# Patient Record
Sex: FEMALE | Race: WHITE | Employment: OTHER | URBAN - METROPOLITAN AREA
[De-identification: names, ages, dates, MRNs, and addresses within clinical notes are randomized per-mention and may not be internally consistent; named-entity substitution may affect disease eponyms.]

---

## 2017-03-25 PROBLEM — J10.1 INFLUENZA A: Status: ACTIVE | Noted: 2017-03-25

## 2017-03-25 PROBLEM — J18.9 CAP (COMMUNITY ACQUIRED PNEUMONIA): Status: ACTIVE | Noted: 2017-03-25

## 2017-03-25 PROBLEM — F41.9 ANXIETY AND DEPRESSION: Status: ACTIVE | Noted: 2017-03-25

## 2017-03-25 PROBLEM — F32.A ANXIETY AND DEPRESSION: Status: ACTIVE | Noted: 2017-03-25

## 2017-04-21 ENCOUNTER — HOSPITAL ENCOUNTER (OUTPATIENT)
Dept: MAMMOGRAPHY | Age: 82
Discharge: HOME | End: 2017-04-21
Attending: RADIOLOGY | Admitting: RADIOLOGY

## 2017-04-21 DIAGNOSIS — C50.411 BREAST CANCER OF UPPER-OUTER QUADRANT OF RIGHT FEMALE BREAST (HCC): ICD-10-CM

## 2017-10-27 ENCOUNTER — HOSPITAL ENCOUNTER (OUTPATIENT)
Dept: MAMMOGRAPHY | Age: 82
Discharge: HOME | End: 2017-10-27
Attending: RADIOLOGY | Admitting: RADIOLOGY

## 2017-10-27 DIAGNOSIS — C50.411 MALIGNANT NEOPLASM OF UPPER-OUTER QUADRANT OF RIGHT FEMALE BREAST, UNSPECIFIED ESTROGEN RECEPTOR STATUS (HCC): ICD-10-CM

## 2017-11-20 ENCOUNTER — OFFICE VISIT (OUTPATIENT)
Dept: DERMATOLOGY | Age: 82
End: 2017-11-20

## 2017-11-20 DIAGNOSIS — Z85.828 HISTORY OF NONMELANOMA SKIN CANCER: ICD-10-CM

## 2017-11-20 DIAGNOSIS — Z12.83 SCREENING EXAM FOR SKIN CANCER: ICD-10-CM

## 2017-11-20 DIAGNOSIS — D48.5 NEOPLASM OF UNCERTAIN BEHAVIOR OF SKIN: ICD-10-CM

## 2017-11-20 DIAGNOSIS — L57.0 ACTINIC KERATOSES: Primary | ICD-10-CM

## 2017-11-20 PROCEDURE — 1036F TOBACCO NON-USER: CPT | Performed by: DERMATOLOGY

## 2017-11-20 PROCEDURE — 17003 DESTRUCT PREMALG LES 2-14: CPT | Performed by: DERMATOLOGY

## 2017-11-20 PROCEDURE — G8400 PT W/DXA NO RESULTS DOC: HCPCS | Performed by: DERMATOLOGY

## 2017-11-20 PROCEDURE — 99213 OFFICE O/P EST LOW 20 MIN: CPT | Performed by: DERMATOLOGY

## 2017-11-20 PROCEDURE — G8484 FLU IMMUNIZE NO ADMIN: HCPCS | Performed by: DERMATOLOGY

## 2017-11-20 PROCEDURE — 17000 DESTRUCT PREMALG LESION: CPT | Performed by: DERMATOLOGY

## 2017-11-20 PROCEDURE — G8421 BMI NOT CALCULATED: HCPCS | Performed by: DERMATOLOGY

## 2017-11-20 PROCEDURE — G8427 DOCREV CUR MEDS BY ELIG CLIN: HCPCS | Performed by: DERMATOLOGY

## 2017-11-20 PROCEDURE — 11100 PR BIOPSY OF SKIN LESION: CPT | Performed by: DERMATOLOGY

## 2017-11-20 PROCEDURE — 1123F ACP DISCUSS/DSCN MKR DOCD: CPT | Performed by: DERMATOLOGY

## 2017-11-20 PROCEDURE — 4040F PNEUMOC VAC/ADMIN/RCVD: CPT | Performed by: DERMATOLOGY

## 2017-11-20 PROCEDURE — 1090F PRES/ABSN URINE INCON ASSESS: CPT | Performed by: DERMATOLOGY

## 2017-11-20 RX ORDER — L-DESOXYEPHEDRINE 50 MG
INHALER (EA) NASAL
COMMUNITY

## 2017-11-20 NOTE — PROGRESS NOTES
[facility]) Dermatology  Silvia Dixon MD  [phone]      Ozzy Contreras  1934    80 y.o. female     Date of Visit: 11/20/2017    Last Visit: 6mo    Chief Complaint: Skin check    History of Present Illness:  1. Here for skin check. Hx NMSC - nBCC R posterior neck s/p Mohs 10/2015, SCCIS L upper lip s/p ED&C 9/2015, superficial SCC R shin s/p Mohs 10/2015.   -No new lesions of concern   -Avoids sun as much as possible   2. History of actinic keratoses s/p cryotherapy. Reports rough lesions on hands. Review of Systems:  Constitutional: Reports general sense of well-being. Skin: No interval severe sunburns. Allergies: Reviewed and updated. Past Medical History, Surgical History, Medications and Allergies reviewed. Past Medical History:   Diagnosis Date    Alzheimer disease     Anxiety     Cancer ([facility] [address]     skin-various basal and squamous cell.  Dr. Aaron Ordoñez, Dr. 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    Influenza A 03/24/2017    rapid nasal     Past Surgical History:   Procedure Laterality Date    BREAST LUMPECTOMY  2010    COLONOSCOPY  17 Aug 2016    Diverticulosis    OTHER SURGICAL HISTORY Right 10/31/14    incision and drainage of right breast abcess    OTHER SURGICAL HISTORY      excision mass right lower leg    TONSILLECTOMY      UPPER GASTROINTESTINAL ENDOSCOPY         Allergies   Allergen Reactions    Polysporin [Bacitracin-Polymyxin B]      Contact dermititis/\"all sporins\"     Outpatient Prescriptions Marked as Taking for the 11/20/17 encounter (Office Visit) with Silvia Dixon MD   Medication Sig Dispense Refill    Camphor-Eucalyptus-Menthol (VICKS VAPORUB) 4.7-1.2-2.6 % OINT Apply topically      sertraline (ZOLOFT) 25 MG tablet Take 1 tablet by mouth daily 30 tablet 3    LORazepam (ATIVAN) 0.5 MG tablet Take 0.5 mg by mouth 2 times daily as needed      traZODone (DESYREL) 50 MG tablet Take 50 mg by mouth nightly       aspirin 81 MG chewable risk of blister formation, discomfort, scar, hypopigmentation. Discussed wound care.

## 2017-11-29 ENCOUNTER — TELEPHONE (OUTPATIENT)
Dept: DERMATOLOGY | Age: 82
End: 2017-11-29

## 2017-11-29 NOTE — TELEPHONE ENCOUNTER
Called & spoke to PT to review results of the biopsy. Date of biopsy: 11/20/2017    Site of biopsy: LT upper back    Result: BCC, focally infiltrating    Plan: Schedule for excision - 1/11/18 @ 11:30a - arrive 11:00a  @ [facility]. (Pre op letter mailed) [facility] 11/29/2017    The patient expressed understanding of the plan. I advised to call w/any questions/concerns.

## 2018-01-11 ENCOUNTER — PROCEDURE VISIT (OUTPATIENT)
Dept: DERMATOLOGY | Age: 83
End: 2018-01-11

## 2018-01-11 VITALS
HEART RATE: 88 BPM | DIASTOLIC BLOOD PRESSURE: 72 MMHG | BODY MASS INDEX: 30.29 KG/M2 | WEIGHT: 182 LBS | SYSTOLIC BLOOD PRESSURE: 111 MMHG

## 2018-01-11 DIAGNOSIS — C44.519 BASAL CELL CARCINOMA OF UPPER BACK: Primary | ICD-10-CM

## 2018-01-11 PROCEDURE — 11602 EXC TR-EXT MAL+MARG 1.1-2 CM: CPT | Performed by: DERMATOLOGY

## 2018-01-11 PROCEDURE — 12032 INTMD RPR S/A/T/EXT 2.6-7.5: CPT | Performed by: DERMATOLOGY

## 2018-01-11 NOTE — PROGRESS NOTES
[facility]) Dermatology  Sukhi Pedro MD  [phone]      Shyanne Contreras  1934    80 y.o. female     Date of Visit: 1/11/2018    Chief Complaint: BCC    History of Present Illness:  1. Pt is here for treatment of focally infiltrating BCC on L upper back. No concerns since biopsy.    -Pacemaker/ICD: No  -Joint prosthesis: No  -Difficulty with numbing in the past: No  -Local Anesthesia Reaction: No  -Artificial Heart Valve: No  -Organ Transplant: No  -Immunosuppression: No  -Bleeding/Clotting Disorders: No  -Anticoagulant Therapy: No  -Prior problems with wound healing: No    Review of Systems:  Constitutional: Reports general sense of well-being. Past Medical History, Medications and Allergies reviewed. Past Medical History:   Diagnosis Date    Alzheimer disease     Anxiety     Cancer ([facility] [address]     skin-various basal and squamous cell.  Dr. Mahendra Ochoa, Dr. 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    Influenza A 03/24/2017    rapid nasal     Past Surgical History:   Procedure Laterality Date    BREAST LUMPECTOMY  2010    COLONOSCOPY  17 Aug 2016    Diverticulosis    OTHER SURGICAL HISTORY Right 10/31/14    incision and drainage of right breast abcess    OTHER SURGICAL HISTORY      excision mass right lower leg    TONSILLECTOMY      UPPER GASTROINTESTINAL ENDOSCOPY         Allergies   Allergen Reactions    Polysporin [Bacitracin-Polymyxin B]      Contact dermititis/\"all sporins\"     Outpatient Prescriptions Marked as Taking for the 1/11/18 encounter (Procedure visit) with Sukhi Pedro MD   Medication Sig Dispense Refill    Camphor-Eucalyptus-Menthol (VICKS VAPORUB) 4.7-1.2-2.6 % OINT Apply topically      sertraline (ZOLOFT) 25 MG tablet Take 1 tablet by mouth daily 30 tablet 3    LORazepam (ATIVAN) 0.5 MG tablet Take 0.5 mg by mouth 2 times daily as needed      aspirin 81 MG chewable tablet Take 1 tablet by mouth daily 30 tablet 0       Physical Examination

## 2018-01-15 ENCOUNTER — TELEPHONE (OUTPATIENT)
Dept: DERMATOLOGY | Age: 83
End: 2018-01-15

## 2018-01-15 NOTE — TELEPHONE ENCOUNTER
Spoke with patient's spouse Gwen Contreras (ok per [employer]) and informed him of biopsy result from 1-11-18. 1.Location: Left upper back     Result: No residual basal cell carcinoma within the re-excised tissue.      Plan: No further treatment needed

## 2018-01-25 ENCOUNTER — NURSE ONLY (OUTPATIENT)
Dept: DERMATOLOGY | Age: 83
End: 2018-01-25

## 2018-01-25 DIAGNOSIS — C44.519 BASAL CELL CARCINOMA OF SKIN OF OTHER PART OF TRUNK: Primary | ICD-10-CM

## 2018-01-25 PROCEDURE — 99024 POSTOP FOLLOW-UP VISIT: CPT | Performed by: DERMATOLOGY

## 2018-01-25 NOTE — PROGRESS NOTES
Patient presents for suture removal. The wound is well healed without signs of infection. The sutures are removed. Wound care and activity instructions given. Return prn.

## 2018-05-17 ENCOUNTER — OFFICE VISIT (OUTPATIENT)
Dept: DERMATOLOGY | Age: 83
End: 2018-05-17

## 2018-05-17 DIAGNOSIS — Z12.83 SCREENING EXAM FOR SKIN CANCER: ICD-10-CM

## 2018-05-17 DIAGNOSIS — L57.0 ACTINIC KERATOSES: Primary | ICD-10-CM

## 2018-05-17 DIAGNOSIS — Z85.828 HISTORY OF NONMELANOMA SKIN CANCER: ICD-10-CM

## 2018-05-17 PROCEDURE — G8400 PT W/DXA NO RESULTS DOC: HCPCS | Performed by: DERMATOLOGY

## 2018-05-17 PROCEDURE — 99213 OFFICE O/P EST LOW 20 MIN: CPT | Performed by: DERMATOLOGY

## 2018-05-17 PROCEDURE — 1090F PRES/ABSN URINE INCON ASSESS: CPT | Performed by: DERMATOLOGY

## 2018-05-17 PROCEDURE — G8427 DOCREV CUR MEDS BY ELIG CLIN: HCPCS | Performed by: DERMATOLOGY

## 2018-05-17 PROCEDURE — 17003 DESTRUCT PREMALG LES 2-14: CPT | Performed by: DERMATOLOGY

## 2018-05-17 PROCEDURE — 4040F PNEUMOC VAC/ADMIN/RCVD: CPT | Performed by: DERMATOLOGY

## 2018-05-17 PROCEDURE — G8417 CALC BMI ABV UP PARAM F/U: HCPCS | Performed by: DERMATOLOGY

## 2018-05-17 PROCEDURE — 1036F TOBACCO NON-USER: CPT | Performed by: DERMATOLOGY

## 2018-05-17 PROCEDURE — 1123F ACP DISCUSS/DSCN MKR DOCD: CPT | Performed by: DERMATOLOGY

## 2018-05-17 PROCEDURE — 17000 DESTRUCT PREMALG LESION: CPT | Performed by: DERMATOLOGY

## 2018-05-17 RX ORDER — DIVALPROEX SODIUM 125 MG/1
125 CAPSULE, COATED PELLETS ORAL 2 TIMES DAILY
COMMUNITY

## 2018-07-18 ENCOUNTER — HOSPITAL ENCOUNTER (OUTPATIENT)
Dept: WOMENS IMAGING | Age: 83
Discharge: HOME | End: 2018-07-18
Payer: MEDICARE

## 2018-07-18 DIAGNOSIS — Z78.0 POST-MENOPAUSAL: ICD-10-CM

## 2018-07-18 PROCEDURE — 77080 DXA BONE DENSITY AXIAL: CPT

## 2018-09-26 PROBLEM — J10.1 INFLUENZA A: Status: RESOLVED | Noted: 2017-03-25 | Resolved: 2018-09-26

## 2018-11-26 ENCOUNTER — OFFICE VISIT (OUTPATIENT)
Dept: DERMATOLOGY | Age: 83
End: 2018-11-26
Payer: MEDICARE

## 2018-11-26 DIAGNOSIS — L57.0 ACTINIC KERATOSES: ICD-10-CM

## 2018-11-26 DIAGNOSIS — Z12.83 SCREENING EXAM FOR SKIN CANCER: ICD-10-CM

## 2018-11-26 DIAGNOSIS — Z85.828 HISTORY OF NONMELANOMA SKIN CANCER: ICD-10-CM

## 2018-11-26 DIAGNOSIS — D48.5 NEOPLASM OF UNCERTAIN BEHAVIOR OF SKIN: Primary | ICD-10-CM

## 2018-11-26 PROCEDURE — 1090F PRES/ABSN URINE INCON ASSESS: CPT | Performed by: DERMATOLOGY

## 2018-11-26 PROCEDURE — 1036F TOBACCO NON-USER: CPT | Performed by: DERMATOLOGY

## 2018-11-26 PROCEDURE — G8417 CALC BMI ABV UP PARAM F/U: HCPCS | Performed by: DERMATOLOGY

## 2018-11-26 PROCEDURE — 99213 OFFICE O/P EST LOW 20 MIN: CPT | Performed by: DERMATOLOGY

## 2018-11-26 PROCEDURE — 4040F PNEUMOC VAC/ADMIN/RCVD: CPT | Performed by: DERMATOLOGY

## 2018-11-26 PROCEDURE — G8427 DOCREV CUR MEDS BY ELIG CLIN: HCPCS | Performed by: DERMATOLOGY

## 2018-11-26 PROCEDURE — 11100 PR BIOPSY OF SKIN LESION: CPT | Performed by: DERMATOLOGY

## 2018-11-26 PROCEDURE — G8484 FLU IMMUNIZE NO ADMIN: HCPCS | Performed by: DERMATOLOGY

## 2018-11-26 PROCEDURE — 1123F ACP DISCUSS/DSCN MKR DOCD: CPT | Performed by: DERMATOLOGY

## 2018-11-26 PROCEDURE — 1101F PT FALLS ASSESS-DOCD LE1/YR: CPT | Performed by: DERMATOLOGY

## 2018-11-26 PROCEDURE — 17003 DESTRUCT PREMALG LES 2-14: CPT | Performed by: DERMATOLOGY

## 2018-11-26 PROCEDURE — G8399 PT W/DXA RESULTS DOCUMENT: HCPCS | Performed by: DERMATOLOGY

## 2018-11-26 PROCEDURE — 17000 DESTRUCT PREMALG LESION: CPT | Performed by: DERMATOLOGY

## 2018-11-26 NOTE — PROGRESS NOTES
[facility]) Dermatology  Hero Cedillo MD  [phone]      Jose E Contreras  1934    80 y.o. female     Date of Visit: 11/26/2018    Last Visit: 6mo    Chief Complaint: Skin check    History of Present Illness:  1. Here for skin check. Hx NMSC - nBCC R posterior neck s/p Mohs 10/2015, SCCIS L upper lip s/p ED&C 9/2015, superficial SCC R shin s/p Mohs 10/2015, focally infiltrating L upper back s/p excision 1/2018.   -No new lesions of concern   -Avoids sun as much as possible   2. History of actinic keratoses s/p cryotherapy. Unsure of new lesions     Review of Systems:  Constitutional: Reports general sense of well-being. Skin: No interval severe sunburns. Allergies: Reviewed and updated. Past Medical History, Surgical History, Medications and Allergies reviewed. Past Medical History:   Diagnosis Date    Alzheimer disease     Anxiety     Cancer ([facility] [address]     skin-various basal and squamous cell.  Dr. Ivet Baumann, Dr. 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    Influenza A 03/24/2017    rapid nasal     Past Surgical History:   Procedure Laterality Date    BREAST LUMPECTOMY  2010    COLONOSCOPY  17 Aug 2016    Diverticulosis    OTHER SURGICAL HISTORY Right 10/31/14    incision and drainage of right breast abcess    OTHER SURGICAL HISTORY      excision mass right lower leg    TONSILLECTOMY      UPPER GASTROINTESTINAL ENDOSCOPY         Allergies   Allergen Reactions    Polysporin [Bacitracin-Polymyxin B]      Contact dermititis/\"all sporins\"     Outpatient Prescriptions Marked as Taking for the 11/26/18 encounter (Office Visit) with Hero Cedillo MD   Medication Sig Dispense Refill    cyanocobalamin 1000 MCG tablet Take 1,000 mcg by mouth      divalproex (DEPAKOTE SPRINKLES) 125 MG capsule Take 125 mg by mouth 2 times daily      Camphor-Eucalyptus-Menthol (VICKS VAPORUB) 4.7-1.2-2.6 % OINT Apply topically      sertraline (ZOLOFT) 25 MG tablet Take 1 tablet by mouth

## 2018-11-29 LAB — DERMATOLOGY PATHOLOGY REPORT: ABNORMAL

## 2018-12-04 ENCOUNTER — TELEPHONE (OUTPATIENT)
Dept: DERMATOLOGY | Age: 83
End: 2018-12-04

## 2018-12-06 NOTE — TELEPHONE ENCOUNTER
Left a message for daughter Carmela Harrison, informed her of the excision info. Call back if questions

## 2019-01-24 ENCOUNTER — PROCEDURE VISIT (OUTPATIENT)
Dept: DERMATOLOGY | Age: 84
End: 2019-01-24
Payer: MEDICARE

## 2019-01-24 VITALS
WEIGHT: 154.6 LBS | DIASTOLIC BLOOD PRESSURE: 70 MMHG | BODY MASS INDEX: 24.95 KG/M2 | SYSTOLIC BLOOD PRESSURE: 110 MMHG | HEART RATE: 92 BPM

## 2019-01-24 DIAGNOSIS — C44.519 BASAL CELL CARCINOMA (BCC) OF UPPER BACK: Primary | ICD-10-CM

## 2019-01-24 PROCEDURE — 11602 EXC TR-EXT MAL+MARG 1.1-2 CM: CPT | Performed by: DERMATOLOGY

## 2019-01-24 PROCEDURE — 12032 INTMD RPR S/A/T/EXT 2.6-7.5: CPT | Performed by: DERMATOLOGY

## 2019-01-24 RX ORDER — QUETIAPINE FUMARATE 25 MG/1
TABLET, FILM COATED ORAL
COMMUNITY
Start: 2019-01-22

## 2019-01-29 ENCOUNTER — TELEPHONE (OUTPATIENT)
Dept: DERMATOLOGY | Age: 84
End: 2019-01-29

## 2019-01-29 LAB — DERMATOLOGY PATHOLOGY REPORT: ABNORMAL

## 2019-02-06 ENCOUNTER — TELEPHONE (OUTPATIENT)
Dept: DERMATOLOGY | Age: 84
End: 2019-02-06

## 2019-02-11 ENCOUNTER — NURSE ONLY (OUTPATIENT)
Dept: DERMATOLOGY | Age: 84
End: 2019-02-11

## 2019-02-11 DIAGNOSIS — C44.519 BASAL CELL CARCINOMA OF UPPER BACK: Primary | ICD-10-CM
